# Patient Record
Sex: FEMALE | Race: AMERICAN INDIAN OR ALASKA NATIVE | Employment: FULL TIME | URBAN - METROPOLITAN AREA
[De-identification: names, ages, dates, MRNs, and addresses within clinical notes are randomized per-mention and may not be internally consistent; named-entity substitution may affect disease eponyms.]

---

## 2022-08-10 ENCOUNTER — APPOINTMENT (OUTPATIENT)
Dept: GENERAL RADIOLOGY | Facility: HOSPITAL | Age: 32
End: 2022-08-10
Attending: NURSE PRACTITIONER
Payer: COMMERCIAL

## 2022-08-10 ENCOUNTER — HOSPITAL ENCOUNTER (EMERGENCY)
Facility: HOSPITAL | Age: 32
Discharge: HOME | End: 2022-08-10
Payer: COMMERCIAL

## 2022-08-10 VITALS
BODY MASS INDEX: 21.7 KG/M2 | DIASTOLIC BLOOD PRESSURE: 82 MMHG | OXYGEN SATURATION: 98 % | RESPIRATION RATE: 17 BRPM | HEIGHT: 71 IN | HEART RATE: 70 BPM | SYSTOLIC BLOOD PRESSURE: 120 MMHG | TEMPERATURE: 98 F | WEIGHT: 155 LBS

## 2022-08-10 DIAGNOSIS — S06.0X0A CONCUSSION WITHOUT LOSS OF CONSCIOUSNESS, INITIAL ENCOUNTER: Primary | ICD-10-CM

## 2022-08-10 DIAGNOSIS — M54.2 CERVICALGIA: ICD-10-CM
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
## 2022-08-10 DIAGNOSIS — R51.9 ACUTE NONINTRACTABLE HEADACHE, UNSPECIFIED HEADACHE TYPE: ICD-10-CM

## 2022-08-10 DIAGNOSIS — M54.50 ACUTE LOW BACK PAIN WITHOUT SCIATICA, UNSPECIFIED BACK PAIN LATERALITY: ICD-10-CM

## 2022-08-10 LAB — B-HCG UR QL: NEGATIVE

## 2022-08-10 PROCEDURE — 81025 URINE PREGNANCY TEST: CPT

## 2022-08-10 PROCEDURE — 99283 EMERGENCY DEPT VISIT LOW MDM: CPT

## 2022-08-10 PROCEDURE — 72100 X-RAY EXAM L-S SPINE 2/3 VWS: CPT | Performed by: NURSE PRACTITIONER

## 2022-08-10 PROCEDURE — 72050 X-RAY EXAM NECK SPINE 4/5VWS: CPT | Performed by: NURSE PRACTITIONER

## 2022-08-10 PROCEDURE — 72040 X-RAY EXAM NECK SPINE 2-3 VW: CPT | Performed by: NURSE PRACTITIONER

## 2022-08-10 RX ORDER — ACETAMINOPHEN 500 MG
1000 TABLET ORAL ONCE
Status: COMPLETED | OUTPATIENT
Start: 2022-08-10 | End: 2022-08-10
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
## (undated) NOTE — LETTER
Date & Time: 8/10/2022, 2:26 PM  Patient: Angella Hidalgo  Encounter Provider(s):    Jagjit Plaza Nelida APRMATEO       To Whom It May Concern:    Angella Hidalgo was seen and treated in our department on 8/10/2022. She can return to work 08/15/22.     If you have any questions or concerns, please do not hesitate to call.        _____________________________  Physician/APC Signature